# Patient Record
(demographics unavailable — no encounter records)

---

## 2024-11-14 NOTE — ASSESSMENT
[FreeTextEntry1] : #Rosacea, mild - chronic; improved/stable - Continue metronidazole cream nightly to face  #History of BCC - No evidence of recurrence - Sun protective measures reinforced - Recommend full body skin exam atleast annually    #Angioma #Solar lentigo #Screening exam for skin cancer - no suspicious lesions on exam today - TBSE performed today - Advised sun protection. Recommended OTC sunscreen products (EltaMD/Neutrogena/La Roche Posay), including SPF30+ with broadband UV protection as well as proper use. Discussed OTC sun protective clothing - Counseled patient to monitor for changes, including mole monitoring and self-skin exams  #Rhytides - DIscussed Pixel for upper lip; COS fee discussed YPP per session. Pt will think about it

## 2024-11-14 NOTE — HISTORY OF PRESENT ILLNESS
[FreeTextEntry1] : spots [de-identified] : Ms. RASHMI HANNA is a 59 year old F here for evaluation of below  #Rosacea, improved with metrocream daily #Lines on upper lip #FBSE.  Spots scattered on body x years. Asymptomatic and unchanged. No alleviating/aggravating factors. Never been treated.  Personal hx of skin cancer: BCC on L midback biopsied 3/24/22, s/p ED&C in May 2022. FHx of skin cancer: unsure if mom had skin cancer Social Hx: not working

## 2024-11-14 NOTE — PHYSICAL EXAM
[Alert] : alert [Oriented x 3] : ~L oriented x 3 [Well Nourished] : well nourished [Conjunctiva Non-injected] : conjunctiva non-injected [No Visual Lymphadenopathy] : no visual  lymphadenopathy [No Clubbing] : no clubbing [No Edema] : no edema [No Bromhidrosis] : no bromhidrosis [No Chromhidrosis] : no chromhidrosis [Full Body Skin Exam Performed] : performed [FreeTextEntry3] : General: Alert and oriented, in NAD.  All of the following were examined and were within normal limits, except as noted:   Scalp: Face, including eyelids, nose, lips, ears, oropharynx: Neck: Chest/Back/Abdomen: Bilateral Arms/Hands: Bilateral Legs/Feet: Buttocks, Genitalia, Anus/perineum:  	 Hair, Nails, Oral Mucosa, Eyes:   rhytides upper lip angiomas lentigines SKs

## 2025-01-06 NOTE — ASSESSMENT
[FreeTextEntry1] : 59 yo woman with history of bronchiatesis with chronic cough, rhinitis, right hearing loss, and dry eye on xidra presents for evaluation of OP.   DEXA 2023 shows spine T score -2.5  negative SYLVIA/sjogrens   --cont fosamax  --she is encouraged to start wt bearing exercises  --she is cont calcium and vit d supplementation  --dental check at least every 4 months  --next dexa 7/11/2025 --labs today.  given bronchiectasis , dry eye- will check serologies

## 2025-01-06 NOTE — HISTORY OF PRESENT ILLNESS
[FreeTextEntry1] : 59 yo woman with history of bronchiectasis with chronic cough.  She apparently had bronchoscopy 2022 and was noted to have 3 bacteria's and was on IV antibiotics.  repeat bronch no bacteria. Patient also with long standing hearing loss on the right ear.  thought to have been post viral  dry eye using xidra, .  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  tolerating this well. we reviewed how she is taking the medication.  she is also a tap dancer and walks for exercise.  dental check every 4 months calcium 600 vit D 2000u daily  no falls and no new fractures   patient denies any dental issues.

## 2025-01-06 NOTE — PHYSICAL EXAM
[General Appearance - Well Nourished] : well nourished [General Appearance - Well Developed] : well developed [Sclera] : the sclera and conjunctiva were normal [Hearing Threshold Finger Rub Not Costilla] : hearing was normal [] : no rash [Skin Lesions] : no skin lesions [Affect] : the affect was normal [Mood] : the mood was normal [Nail Clubbing] : no clubbing  or cyanosis of the fingernails [Musculoskeletal - Swelling] : no joint swelling seen [Motor Tone] : muscle strength and tone were normal

## 2025-01-16 NOTE — ASSESSMENT
[FreeTextEntry1] : Flu vaccine administered Seen by Rheumatologist Labs okay dry eye using xidra, . history of rhinitis no morning stiffness no joint pain Advised to d/c nasal steroids and try Mucinex RF slightly high CCP ab neg  Osteoporosis started on alendronate 2024. tolerating this well. we reviewed how she is taking the medication. she is also a tap dancer and walks for exercise. dental check every 4 months calcium 600 vit D 2000u daily Osteoporosis Rheum DR Hernández-Margot DEXA 2023 shows spine T score -2.5 - On Fosamax Weight bearing exercises, she dances regularly calcium and vit d supplementation dental check at least every 6 months Rpt dexa 7/11/2025  Seen by Dr Magaña , she states she was told she has myopic degeneration.  She had cataract surgery last year. Advised to see retinal specialist 1-2 times a year Dr Telles, right eye was yellow Seen in  was told she needed MRI MRI/ MRV Normal. ? maybe hemorrhagic resolution of bleed showed yellowish discoloration was what was considered. Symptoms resolved.  Dermatologist Dr Sebastian Annual visit 11/23 Wart resolved with compound W. Did not see Dermatologist yet. Has appointment next week Bronchiectasis: -On Trelegy Ellipta inhaler -regular follow up with pulmonologist    Seasonal allergies on Allegra  States she discussed with Dr. Aragon may switch to Xyzal Bronchiectasis Pulmonary DR Pratt Seen in April 2024 Was treated with Levaquin History of bronchiectasis currently asymptomatic. On Trelegy Ellipta Seen by Dr Pratt Had CXR done as a follow up ? infiltrates were treated with z pack. Aerobika device is used as per Dr Pratt. Follow up 4/25   Seen by ENT after nose bleed from FLonase Rx for antibiotic from ENT for 1 week nasal. Symptoms resolved Stopped using Flonase.  Follow up in 6 months

## 2025-01-16 NOTE — REVIEW OF SYSTEMS
[Itching] : Itching [Skin Rash] : skin rash [Negative] : Heme/Lymph [Discharge] : no discharge [Pain] : no pain [Redness] : no redness [Dryness] : no dryness  [Vision Problems] : no vision problems [Itching] : no itching [Earache] : no earache [Hoarseness] : no hoarseness [Nasal Discharge] : no nasal discharge [Shortness Of Breath] : no shortness of breath [Wheezing] : no wheezing [Cough] : no cough [Dyspnea on Exertion] : no dyspnea on exertion

## 2025-01-16 NOTE — PHYSICAL EXAM
[Normal Sclera/Conjunctiva] : normal sclera/conjunctiva [Normal] : no joint swelling and grossly normal strength and tone [de-identified] : Small papular lesions noted on the neck and around the chin.

## 2025-01-16 NOTE — END OF VISIT
[Time Spent: ___ minutes] : I have spent [unfilled] minutes of time on the encounter which excludes teaching and separately reported services. Size Of Lesion: 2 mm Morphology Per Location (Optional): Brown macule Detail Level: Detailed Size Of Lesion: 2mm

## 2025-01-16 NOTE — HISTORY OF PRESENT ILLNESS
[de-identified] : Ms. RASHMI HANNA is a 59 year old female presenting for a follow up Seen by Dr Pratt Had CXR done as a follow up ? infiltrates were treated with z pack. Aerobika device is used as per Dr Pratt. Seen by Rheumatologist Dr Sheehan Seen by Dr Magaña Advised to see retinal specialist 1-2 times a year  Dr Telles, ref to Dr Marie for second opinion. Was also ref to [facility] seeing him yearly. He advised to monitor. DR Urbano Had procedure in left eye, has improvement in vision. Monitoring a Naevus. Also has epiretinal membrane. Pain resolved yesterday. She started Allegra D. Does not take flonase and NSAIDs. Concerned about wart in finger.  She is using Compound W. History of bronchiectasis currently asymptomatic. Had a workup by GYN in [place] Dr Tova Avila Seeing Dr Sebastian Dermatologist annually.   Prev Hx: Has not seen the allergist. Had a w/u for chronic cough last year. Seen by Pulmonologist Dr Pratt / ENT Dr Pedraza. Audiology eval done. Left was normal. Right has some hearing loss, has follow up in 1 year. Eye exam Dr Rich. Has cataract, will be scheduling. Seen by Retinal specialist. / Vitreal tear with some scarring.  Seen by Dr Manning 8/20. Echo was improved. q 3 years follow up. Skin check annually Dr Young. Patch in right leg, seen by Dr Young. Had scraping/ Bx. Was prescribed mupirocin. Noticed some redness after using it. Stopped it and redness improved.   HEavy cycles seen by Dr Donohue, had ablation 10/20. Had tried Mirena, which was expelled spontaneously.  No cycles after one heavy long cycle.  Started on biotin for hair loss, helps with nails.

## 2025-05-08 NOTE — PHYSICAL EXAM
[Chaperoned Physical Exam] : A chaperone was present in the examining room during all aspects of the physical examination. [Appropriately responsive] : appropriately responsive [Alert] : alert [No Acute Distress] : no acute distress [Soft] : soft [Non-tender] : non-tender [Non-distended] : non-distended [Oriented x3] : oriented x3 [Examination Of The Breasts] : a normal appearance [No Discharge] : no discharge [No Masses] : no breast masses were palpable [Labia Majora] : normal [Labia Minora] : normal [Normal] : normal [Atrophy] : atrophy [Dry Mucosa] : dry mucosa [No Bleeding] : There was no active vaginal bleeding [Absent] : absent [Uterine Adnexae] : non-palpable

## 2025-05-18 NOTE — END OF VISIT
[FreeTextEntry3] : I, January Middleton solely acted as scribe for Dr. Viridiana Alvarado on 05/08/2025  All medical entries made by the Scribe were at my, Dr. Thompson, direction and personally dictated by me on 05/08/2025 . I have reviewed the chart and agree that the record accurately reflects my personal performance of the history, physical exam, assessment and plan. I have also personally directed, reviewed, and agreed with the chart.

## 2025-05-18 NOTE — HISTORY OF PRESENT ILLNESS
[N] : Patient is not sexually active [Y] : Positive pregnancy history [Menarche Age: ____] : age at menarche was [unfilled] [Menopause Age: ____] : age at menopause was [unfilled] [Previously active] : previously active [Men] : men [Mammogramdate] : 05/16/24 [TextBox_19] : NEG [BreastSonogramDate] : 05/16/24 [TextBox_25] : NEG [PapSmeardate] : 03/01/22 [TextBox_31] : NEG [BoneDensityDate] : 04/10/23 [TextBox_37] : OSTEOPOROSIS (pt is seeing rheumatologist and is on Fosamax) [ColonoscopyDate] : 08/31/21 [TextBox_43] : NORMAL, DUE 2031 [HPVDate] : 03/01/22 [TextBox_78] : NEG [LMPDate] : 07/2021 [PGHxTotal] : 2 [[facility]] : 1 [[facility] Center[facility]] : 1 [PGHxABSpont] : 1 [FreeTextEntry1] : 07/2021

## 2025-05-18 NOTE — HISTORY OF PRESENT ILLNESS
[N] : Patient is not sexually active [Y] : Positive pregnancy history [Menarche Age: ____] : age at menarche was [unfilled] [Menopause Age: ____] : age at menopause was [unfilled] [Previously active] : previously active [Men] : men [Mammogramdate] : 05/16/24 [TextBox_19] : NEG [BreastSonogramDate] : 05/16/24 [TextBox_25] : NEG [PapSmeardate] : 03/01/22 [TextBox_31] : NEG [BoneDensityDate] : 04/10/23 [TextBox_37] : OSTEOPOROSIS (pt is seeing rheumatologist and is on Fosamax) [ColonoscopyDate] : 08/31/21 [TextBox_43] : NORMAL, DUE 2031 [HPVDate] : 03/01/22 [TextBox_78] : NEG [LMPDate] : 07/2021 [PGHxTotal] : 2 [[facility] CenterxFulerm] : 1 [[facility] Centeriving] : 1 [PGHxABSpont] : 1 [FreeTextEntry1] : 07/2021

## 2025-05-18 NOTE — REVIEW OF SYSTEMS
[Negative] : Breast [Hot Flashes] : hot flashes [Genital Rash/Irritation] : no genital rash/irritation

## 2025-05-18 NOTE — END OF VISIT
[FreeTextEntry3] : I, January iMddleton solely acted as scribe for Dr. Viridiana Alvarado on 05/08/2025  All medical entries made by the Scribe were at my, Dr. Thompson, direction and personally dictated by me on 05/08/2025 . I have reviewed the chart and agree that the record accurately reflects my personal performance of the history, physical exam, assessment and plan. I have also personally directed, reviewed, and agreed with the chart.

## 2025-05-18 NOTE — PLAN
[FreeTextEntry1] : Pt with hot flashes that have not stopped. We discussed that there are hormonal and non-hormonal treatment options if she desires. Pt declines at this time but was instructed to return to the office if she changes her mind.   Up to date with colon cancer screening.   Pap not done secondary to absent cervix.  Prescription renewal for vagifem given to be used three times a week.   Prescription for mammogram screening and breast US given. Prescription also given for a breast MRI secondary to a high TC score. She will complete mammo and breast US when due, then the breast MRI six months later. Self-breast exam reviewed.  She will follow up annually and as needed.